# Patient Record
Sex: MALE | Race: WHITE | Employment: PART TIME | URBAN - METROPOLITAN AREA
[De-identification: names, ages, dates, MRNs, and addresses within clinical notes are randomized per-mention and may not be internally consistent; named-entity substitution may affect disease eponyms.]

---

## 2018-10-29 ENCOUNTER — HOSPITAL ENCOUNTER (EMERGENCY)
Age: 28
Discharge: HOME | End: 2018-10-29
Payer: MEDICARE

## 2018-10-29 VITALS
DIASTOLIC BLOOD PRESSURE: 86 MMHG | WEIGHT: 185 LBS | OXYGEN SATURATION: 98 % | BODY MASS INDEX: 25.9 KG/M2 | SYSTOLIC BLOOD PRESSURE: 150 MMHG | HEIGHT: 71 IN | RESPIRATION RATE: 16 BRPM | HEART RATE: 82 BPM | TEMPERATURE: 98.6 F

## 2018-10-29 DIAGNOSIS — L50.9 URTICARIA: Primary | ICD-10-CM

## 2018-10-29 PROCEDURE — 99282 EMERGENCY DEPT VISIT SF MDM: CPT

## 2018-10-29 PROCEDURE — 6370000000 HC RX 637 (ALT 250 FOR IP): Performed by: PHYSICIAN ASSISTANT

## 2018-10-29 RX ORDER — FAMOTIDINE 20 MG/1
20 TABLET, FILM COATED ORAL ONCE
Status: COMPLETED | OUTPATIENT
Start: 2018-10-29 | End: 2018-10-29

## 2018-10-29 RX ORDER — DIPHENHYDRAMINE HCL 25 MG
50 TABLET ORAL ONCE
Status: COMPLETED | OUTPATIENT
Start: 2018-10-29 | End: 2018-10-29

## 2018-10-29 RX ADMIN — FAMOTIDINE 20 MG: 20 TABLET ORAL at 22:16

## 2018-10-29 RX ADMIN — DIPHENHYDRAMINE HCL 50 MG: 25 TABLET ORAL at 22:16

## 2018-10-30 NOTE — ED NOTES
Patient presents to ED with complaints of rash starting Saturday. Reports was seen at urgent care and given a steroid shot and started on zpack. Reports now breaking out in hives.       Jocelyn Morfin RN  10/29/18 2044

## 2018-10-30 NOTE — ED PROVIDER NOTES
consciousness is normal   Dermatology: Skin is warm and dry there is no obvious abscesses or lacerations  Rash: hives sporativ on pt bilat uper extremities. Trunk an dback. Lymphatic: There is no submandibular or cervical adenopathy appreciated. Psych: Mentation is grossly normal cognition is grossly normal. Affect is appropriate      I have reviewed and interpreted all of the currently available lab results from this visit (if applicable):  No results found for this visit on 10/29/18. Radiographs (if obtained):  [] The following radiograph was interpreted by myself in the absence of a radiologist:   [] Radiologist's Report Reviewed:  No orders to display       EKG (if obtained):   Please See Note of attending physician for EKG interpretation. Chart review shows recent radiograph(s):  No results found. MDM:   Patient presents today with rash. This rash is most congruent with hives. No evidence of anaphylaxis  Patient will be treated accordingly with benadryl pepcid. Pt to continue claritin and medrol    Differential diagnosis: Vasculitis, bacterial skin infection, viral rash, systemic infectious rash, anaphylaxis, urticaria, exposure to poison ivy or poison oak or other sources of contact dermatitis, vasculitis, bacterial skin infection , viral rash, systemic infectious rash, Anaphylaxis, Urticaria, other  Differential diagnosis includes necrotizing fasciitis, Lavenia Underwood syndrome,  B zoster, varicella-zoster, cellulitis, others others      Pt is to be discharged home. Pt is  to return immediately to the emergency department if he has any new, worrisome or worsening symptoms. Pt is to follow up with PCP within 2 days. Patient/Surrogate vocalizes agreement and understanding with this plan and he has no questions upon disposition. Pt is comfortable upon disposition home. Patient is stable, Patients vital signs are stable. Vital signs and nursing notes reviewed during ED course.  I independently

## 2020-08-17 ASSESSMENT — PAIN DESCRIPTION - PAIN TYPE: TYPE: ACUTE PAIN

## 2020-08-18 ENCOUNTER — APPOINTMENT (OUTPATIENT)
Dept: ULTRASOUND IMAGING | Age: 30
End: 2020-08-18
Payer: MEDICAID

## 2020-08-18 ENCOUNTER — HOSPITAL ENCOUNTER (EMERGENCY)
Age: 30
Discharge: HOME | End: 2020-08-18
Payer: MEDICAID

## 2020-08-18 VITALS
DIASTOLIC BLOOD PRESSURE: 75 MMHG | RESPIRATION RATE: 20 BRPM | HEIGHT: 72 IN | OXYGEN SATURATION: 99 % | BODY MASS INDEX: 25.73 KG/M2 | SYSTOLIC BLOOD PRESSURE: 131 MMHG | HEART RATE: 80 BPM | WEIGHT: 190 LBS | TEMPERATURE: 98.9 F

## 2020-08-18 PROCEDURE — 96372 THER/PROPH/DIAG INJ SC/IM: CPT

## 2020-08-18 PROCEDURE — 99283 EMERGENCY DEPT VISIT LOW MDM: CPT

## 2020-08-18 PROCEDURE — 6360000002 HC RX W HCPCS: Performed by: PHYSICIAN ASSISTANT

## 2020-08-18 PROCEDURE — 93971 EXTREMITY STUDY: CPT

## 2020-08-18 PROCEDURE — 6370000000 HC RX 637 (ALT 250 FOR IP): Performed by: PHYSICIAN ASSISTANT

## 2020-08-18 RX ORDER — DIPHENHYDRAMINE HCL 25 MG
25 CAPSULE ORAL EVERY 6 HOURS PRN
Qty: 20 CAPSULE | Refills: 0 | Status: SHIPPED | OUTPATIENT
Start: 2020-08-18 | End: 2020-08-28

## 2020-08-18 RX ORDER — METHYLPREDNISOLONE 4 MG/1
TABLET ORAL
Qty: 1 KIT | Refills: 0 | Status: SHIPPED | OUTPATIENT
Start: 2020-08-18 | End: 2020-08-24

## 2020-08-18 RX ORDER — DIPHENHYDRAMINE HCL 25 MG
50 TABLET ORAL ONCE
Status: COMPLETED | OUTPATIENT
Start: 2020-08-18 | End: 2020-08-18

## 2020-08-18 RX ORDER — DEXAMETHASONE SODIUM PHOSPHATE 10 MG/ML
10 INJECTION, SOLUTION INTRAMUSCULAR; INTRAVENOUS ONCE
Status: COMPLETED | OUTPATIENT
Start: 2020-08-18 | End: 2020-08-18

## 2020-08-18 RX ADMIN — DIPHENHYDRAMINE HCL 50 MG: 25 TABLET ORAL at 04:48

## 2020-08-18 RX ADMIN — DEXAMETHASONE SODIUM PHOSPHATE 10 MG: 10 INJECTION, SOLUTION INTRAMUSCULAR; INTRAVENOUS at 04:48

## 2020-08-18 NOTE — ED PROVIDER NOTES
I evaluated this patient remotely via a [address] [place] [address] Avenue as a physician in triage. HISTORY OF PRESENT ILLNESS  Dolores Wilson is a 31 y.o. male with swelling and pain in right leg 2 days ago. Noticed a red patch which is now spreading down his leg. States he noticed a similar appearance in his left leg a few hours ago. Denies fever or chills. No trauma or known exposures. No chest pain or dyspnea. Able to eat without vomiting or diarrhea. PHYSICAL EXAM  ED Triage Vitals   BP Temp Temp Source Pulse Resp SpO2 Height Weight   08/17/20 2359 08/17/20 2359 08/17/20 2359 08/17/20 2359 08/17/20 2359 08/17/20 2359 08/17/20 2320 08/17/20 2320   131/75 98.9 °F (37.2 °C) Oral 80 20 99 % 6' (1.829 m) 190 lb (86.2 kg)       On exam, the patient appears well-nourished and in no acute distress. Breathing is unlabored. Mental status is normal. Speech is clear. Face is symmetric without droop. The patient moves all extremities.           Emma Barr DO  08/18/20 0039
status: None    Intimate partner violence     Fear of current or ex partner: None     Emotionally abused: None     Physically abused: None     Forced sexual activity: None   Other Topics Concern    None   Social History Narrative    None       Medications/Allergies     Discharge Medication List as of 8/18/2020  4:40 AM        No Known Allergies     Physical Exam       ED Triage Vitals   BP Temp Temp Source Pulse Resp SpO2 Height Weight   08/17/20 2359 08/17/20 2359 08/17/20 2359 08/17/20 2359 08/17/20 2359 08/17/20 2359 08/17/20 2320 08/17/20 2320   131/75 98.9 °F (37.2 °C) Oral 80 20 99 % 6' (1.829 m) 190 lb (86.2 kg)     GENERAL APPEARANCE:  Well-developed, well-nourished, no acute distress. HEAD:  NC/AT. EYES:  Sclera anicteric. ENT:  Ears, nose, mouth normal.     NECK:  Supple. LUNGS:   Respirations unlabored. EXTREMITIES:  No acute deformities. No appreciable edema. There is a large slightly erythematous area along the right medial thigh with central slightly raised indurated area. Smaller areas to the left thigh and left calf. SKIN:  Warm and dry. NEUROLOGICAL:  Alert and oriented. PSYCHIATRIC:  Normal mood. Diagnostics     Labs:  No results found for this visit on 08/18/20. Radiographs:  Vl Dup Lower Extremity Venous Right    Result Date: 8/18/2020  EXAMINATION: DUPLEX VENOUS ULTRASOUND OF THE RIGHT LOWER EXTREMITY, 8/18/2020 2:55 am TECHNIQUE: Duplex ultrasound and Doppler images were obtained of the right lower extremity. COMPARISON: None. HISTORY: ORDERING SYSTEM PROVIDED HISTORY: pain/edema TECHNOLOGIST PROVIDED HISTORY: Reason for exam:->pain/edema Reason for Exam: Rt leg pain Acuity: Acute Type of Exam: Initial FINDINGS: The visualized veins of the right lower extremity are patent and free of echogenic thrombus. The veins are normally compressible and have normal phasic flow. No evidence of DVT in the right lower extremity.        ED Course and MDM   -  Patient seen and

## 2020-08-18 NOTE — ED NOTES
Patient presents to Ed with complaints of bilateral leg swelling reports begin with the right leg a couple days ago and the left began today. +1 pitting edema noted to bilateral legs. Reports some \"red patches\" to legs. redness and warm. Reports pain near the reddened areas.       Tahira Izquierdo RN  08/18/20 2802

## 2025-05-14 ENCOUNTER — HOSPITAL ENCOUNTER (EMERGENCY)
Age: 35
Discharge: HOME | End: 2025-05-14
Attending: STUDENT IN AN ORGANIZED HEALTH CARE EDUCATION/TRAINING PROGRAM
Payer: COMMERCIAL

## 2025-05-14 VITALS
HEIGHT: 71 IN | WEIGHT: 260 LBS | DIASTOLIC BLOOD PRESSURE: 96 MMHG | RESPIRATION RATE: 18 BRPM | HEART RATE: 93 BPM | OXYGEN SATURATION: 96 % | TEMPERATURE: 98.2 F | BODY MASS INDEX: 36.4 KG/M2 | SYSTOLIC BLOOD PRESSURE: 148 MMHG

## 2025-05-14 DIAGNOSIS — R21 RASH: ICD-10-CM

## 2025-05-14 DIAGNOSIS — L50.9 URTICARIA: Primary | ICD-10-CM

## 2025-05-14 PROCEDURE — 99282 EMERGENCY DEPT VISIT SF MDM: CPT

## 2025-05-14 ASSESSMENT — LIFESTYLE VARIABLES
HOW MANY STANDARD DRINKS CONTAINING ALCOHOL DO YOU HAVE ON A TYPICAL DAY: PATIENT DOES NOT DRINK
HOW OFTEN DO YOU HAVE A DRINK CONTAINING ALCOHOL: NEVER

## 2025-05-14 ASSESSMENT — PAIN - FUNCTIONAL ASSESSMENT: PAIN_FUNCTIONAL_ASSESSMENT: NONE - DENIES PAIN

## 2025-05-15 NOTE — DISCHARGE INSTRUCTIONS
You can use acetaminophen as needed for pain  You can use ibuprofen as needed for pain  You can try oatmeal, calamine or aloe lotion as we discussed.  You can try over-the-counter Benadryl but be careful it can make you drowsy so do not use when driving or operating heavy machinery, or you can try over-the-counter cetirizine which has less sedation side effects like we discussed.  You can call and follow up with dermatology/or allergy/immunology in the next 1-3 days regarding your symptoms  Call and follow-up with your family doctor in the next 1-3 days  Return to the ED if your symptoms worsen or you feel you need to be reevaluated

## 2025-05-15 NOTE — ED PROVIDER NOTES
recognition software and electronically transcribed, and parts may have been transcribed with the assistance of an ED scribe and may contain errors related to that system including errors in grammar, punctuation, and spelling, as well as words and phrases that may be inappropriate.  The transcription may contain errors not detected in proofreading.  Efforts were made to edit the dictations.    Electronically Signed: Fabio Foster MD, 05/14/25, 9:59 PM    I am the Primary Clinician of Record.      Clinical Impression:  1. Urticaria    2. Rash      Disposition referral (if applicable):  [facility]  [address] [place] [address]   [place] [id number]  [phone]        [facility]  [address] [facility]  [address]  [facility] [address]        your family doctor          Disposition medications (if applicable):  There are no discharge medications for this patient.    ED Provider Disposition Time  DISPOSITION Decision To Discharge 05/14/2025 09:49:22 PM   DISPOSITION CONDITION Stable                 Fabio Foster MD  05/14/25 7247